# Patient Record
Sex: FEMALE | Race: WHITE | NOT HISPANIC OR LATINO | Employment: OTHER | URBAN - METROPOLITAN AREA
[De-identification: names, ages, dates, MRNs, and addresses within clinical notes are randomized per-mention and may not be internally consistent; named-entity substitution may affect disease eponyms.]

---

## 2017-08-17 PROBLEM — R06.83 SNORING: Status: ACTIVE | Noted: 2017-08-17

## 2018-01-19 ENCOUNTER — HOSPITAL ENCOUNTER (OUTPATIENT)
Dept: SLEEP MEDICINE | Facility: HOSPITAL | Age: 68
Discharge: HOME | End: 2018-01-19
Attending: NURSE PRACTITIONER
Payer: MEDICARE

## 2018-01-19 DIAGNOSIS — G47.33 OBSTRUCTIVE SLEEP APNEA (ADULT) (PEDIATRIC): ICD-10-CM

## 2018-01-19 PROCEDURE — 95810 POLYSOM 6/> YRS 4/> PARAM: CPT

## 2018-02-06 ENCOUNTER — HOSPITAL ENCOUNTER (OUTPATIENT)
Dept: SLEEP MEDICINE | Facility: HOSPITAL | Age: 68
Discharge: HOME | End: 2018-02-06
Attending: INTERNAL MEDICINE
Payer: MEDICARE

## 2018-02-06 DIAGNOSIS — G47.33 OBSTRUCTIVE SLEEP APNEA (ADULT) (PEDIATRIC): ICD-10-CM

## 2018-02-06 PROCEDURE — 95811 POLYSOM 6/>YRS CPAP 4/> PARM: CPT

## 2018-02-15 PROBLEM — K02.9 DENTAL CARIES: Status: ACTIVE | Noted: 2018-02-15

## 2019-02-13 PROBLEM — R91.1 LUNG NODULE: Status: ACTIVE | Noted: 2019-02-13

## 2020-02-14 PROBLEM — E11.69 HYPERLIPIDEMIA ASSOCIATED WITH TYPE 2 DIABETES MELLITUS: Status: ACTIVE | Noted: 2020-02-14

## 2020-02-14 PROBLEM — E78.5 HYPERLIPIDEMIA ASSOCIATED WITH TYPE 2 DIABETES MELLITUS: Status: ACTIVE | Noted: 2020-02-14

## 2020-06-27 ENCOUNTER — CLINICAL SUPPORT (OUTPATIENT)
Dept: URGENT CARE | Facility: CLINIC | Age: 70
End: 2020-06-27
Payer: MEDICARE

## 2020-06-27 VITALS — TEMPERATURE: 98 F

## 2020-06-27 DIAGNOSIS — Z11.59 ENCOUNTER FOR SCREENING FOR OTHER VIRAL DISEASES: ICD-10-CM

## 2020-06-27 PROCEDURE — U0003 INFECTIOUS AGENT DETECTION BY NUCLEIC ACID (DNA OR RNA); SEVERE ACUTE RESPIRATORY SYNDROME CORONAVIRUS 2 (SARS-COV-2) (CORONAVIRUS DISEASE [COVID-19]), AMPLIFIED PROBE TECHNIQUE, MAKING USE OF HIGH THROUGHPUT TECHNOLOGIES AS DESCRIBED BY CMS-2020-01-R: HCPCS

## 2020-06-27 NOTE — PROGRESS NOTES
Subjective:       Patient ID: Kay Stack is a 69 y.o. female.    Vitals:  tympanic temperature is 97.9 °F (36.6 °C).     Chief Complaint: COVID-19 Concerns    Patient was exposed to COVID 19 but is asymptomatic.      ROS    Objective:      Physical Exam      Assessment:       No diagnosis found.    Plan:         There are no diagnoses linked to this encounter.

## 2020-07-01 LAB — SARS-COV-2 RNA RESP QL NAA+PROBE: NOT DETECTED

## 2020-08-13 PROBLEM — R19.7 DIARRHEA DUE TO MALABSORPTION: Status: ACTIVE | Noted: 2020-08-13

## 2020-08-13 PROBLEM — K90.9 DIARRHEA DUE TO MALABSORPTION: Status: ACTIVE | Noted: 2020-08-13

## 2021-05-04 ENCOUNTER — PATIENT MESSAGE (OUTPATIENT)
Dept: RESEARCH | Facility: HOSPITAL | Age: 71
End: 2021-05-04

## 2024-02-01 ENCOUNTER — HOSPITAL ENCOUNTER (OUTPATIENT)
Dept: RADIOLOGY | Facility: HOSPITAL | Age: 74
Discharge: HOME | End: 2024-02-01
Payer: MEDICARE

## 2024-02-01 DIAGNOSIS — C50.911 INVASIVE LOBULAR CARCINOMA OF BREAST, STAGE 1, RIGHT: ICD-10-CM

## 2024-02-01 PROCEDURE — 77049 MRI BREAST C-+ W/CAD BI: CPT | Mod: 26,,, | Performed by: RADIOLOGY

## 2024-02-01 PROCEDURE — 77049 MRI BREAST C-+ W/CAD BI: CPT | Mod: TC

## 2024-02-01 PROCEDURE — A9577 INJ MULTIHANCE: HCPCS

## 2024-02-01 PROCEDURE — 25500020 PHARM REV CODE 255

## 2024-02-01 RX ADMIN — GADOBENATE DIMEGLUMINE 19 ML: 529 INJECTION, SOLUTION INTRAVENOUS at 04:02

## 2024-03-01 PROBLEM — C50.911: Status: ACTIVE | Noted: 2024-03-01

## 2024-03-13 PROBLEM — M10.072 IDIOPATHIC GOUT INVOLVING TOE OF LEFT FOOT: Status: ACTIVE | Noted: 2024-03-13

## 2024-03-13 PROBLEM — C50.919 MALIGNANT NEOPLASM OF FEMALE BREAST: Status: ACTIVE | Noted: 2024-03-13

## 2024-03-22 ENCOUNTER — CLINICAL SUPPORT (OUTPATIENT)
Dept: REHABILITATION | Facility: HOSPITAL | Age: 74
End: 2024-03-22

## 2024-03-22 DIAGNOSIS — R29.898 WEAKNESS OF BOTH UPPER EXTREMITIES: ICD-10-CM

## 2024-03-22 DIAGNOSIS — R29.3 ABNORMAL POSTURE: ICD-10-CM

## 2024-03-22 DIAGNOSIS — Z91.89 AT RISK FOR LYMPHEDEMA: ICD-10-CM

## 2024-03-22 DIAGNOSIS — M25.612 STIFFNESS OF LEFT SHOULDER JOINT: ICD-10-CM

## 2024-03-22 DIAGNOSIS — M25.611 STIFFNESS OF RIGHT SHOULDER JOINT: Primary | ICD-10-CM

## 2024-03-22 DIAGNOSIS — C50.911 INVASIVE LOBULAR CARCINOMA OF BREAST, STAGE 1, RIGHT: ICD-10-CM

## 2024-03-22 DIAGNOSIS — R68.89 DECREASED FUNCTIONAL ACTIVITY TOLERANCE: ICD-10-CM

## 2024-03-22 PROCEDURE — 97110 THERAPEUTIC EXERCISES: CPT

## 2024-03-22 PROCEDURE — 97162 PT EVAL MOD COMPLEX 30 MIN: CPT

## 2024-03-22 PROCEDURE — 97535 SELF CARE MNGMENT TRAINING: CPT

## 2024-03-22 NOTE — PATIENT INSTRUCTIONS
POST OP PATIENT EDUCATION    Post Operative Instructions         When to call your doctor:  - If any part of your affected arm or axilla is hot, red, or has increased swelling   - If you develop a temperature over 101 degrees Fahrenheit      Lymphedema - Identification and Prevention     Lymphedema - is the swelling of a body area or extremity caused by the accumulation of lymphatic fluid.  There is a risk for lymphedema with the removal of lymph nodes, trauma or radiation therapy.  Treatment of breast cancer often involves surgery: mastectomy or lumpectomy. Some of the lymph nodes in the underarm (called axillary lymph nodes) may be removed and checked to see if they contain cancer cells.     During breast surgery when axillary lymph nodes are removed (with sentinel node biopsy or axillary dissection) or are treated with radiation therapy, the lymphatic system may become impaired. This may prevent lymphatic fluid from leaving the area therefore, causing lymphedema.     Lymphatic fluid is a normal part of the circulatory system. Its function is to remove waste products and to produce cells vital to fighting infection. Swelling occurs when the vessels become restricted and the lymphatic fluid is unable to freely flow through them.  If lymphedema is left untreated, the affected limb could progressively become more swollen, which could lead to hardening of the skin, bulkiness in the limb, infection and impaired wound healing.     There are things you can do to decrease the chance of developing lymphedema.          [email or web address]            The information presented is intended for general information and educational purposes. It is not intended to replace the advice of your health care provider. Contact your health care provider if you believe you have a health problem.                          Trunk Rotation Stretch:     To Stretch the RIGHT Side:  Rotate knees to  the LEFT  Slowly raise your RIGHT arm up and out to the side, maintaining support on mat / bed  Hold for 10 seconds, then slowly lower RIGHT arm to starting position  Repeat 10 second holds x 10 repetitions     To Stretch the LEFT Side:   Rotate knees to the RIGHT  Slowly raise your LEFT arm up and out to the side, maintaining support on mat / bed  Hold for 10 seconds, then slowly lower LEFT arm to starting position  Repeat 10 second holds x 10 repetitions

## 2024-03-26 PROBLEM — R29.898 WEAKNESS OF BOTH UPPER EXTREMITIES: Status: ACTIVE | Noted: 2024-03-26

## 2024-03-26 PROBLEM — R29.3 ABNORMAL POSTURE: Status: ACTIVE | Noted: 2024-03-26

## 2024-03-26 PROBLEM — M25.611 STIFFNESS OF RIGHT SHOULDER JOINT: Status: ACTIVE | Noted: 2024-03-26

## 2024-03-26 PROBLEM — R68.89 DECREASED FUNCTIONAL ACTIVITY TOLERANCE: Status: ACTIVE | Noted: 2024-03-26

## 2024-03-26 PROBLEM — Z91.89 AT RISK FOR LYMPHEDEMA: Status: ACTIVE | Noted: 2024-03-26

## 2024-03-26 PROBLEM — M25.612 STIFFNESS OF LEFT SHOULDER JOINT: Status: ACTIVE | Noted: 2024-03-26

## 2024-03-26 NOTE — PLAN OF CARE
"[facility]   Physical Therapy Initial Evaluation      Name: Kay Ambrizblanc  Clinic Number: [id number]    Therapy Diagnosis:   Encounter Diagnoses   Name Primary?    Stiffness of right shoulder joint Yes    Stiffness of left shoulder joint     Weakness of both upper extremities     Abnormal posture     At risk for lymphedema     Decreased functional activity tolerance     Invasive lobular carcinoma of breast, stage 1, right         Physician: Jazlyn Shah MD    Physician Orders: PT Eval and Treat   Medical Diagnosis from Referral: C50.911 (ICD-10-CM) - Invasive lobular carcinoma of breast, stage 1, right  Evaluation Date: 3/22/2024  Authorization Period Expiration: 12/31/2024  Plan of Care Expiration: 05/17/2024  Progress Note Due: 04/22/2024  Visit # / Visits Authorized: 1 / 1   FOTO: 1/3    Precautions: standard, cancer, HTN, DM2, GERD, osteopenia    Time In: 1:47 PM  Time Out: 2:39 PM  Total Appointment Time (timed & untimed codes): 52 minutes    Subjective     Date of Onset: Abnormal screening mammogram on 12/07/2023 demonstrated an architectural distortion in RIGHT breast. Follow-up mammogram and ultrasound on 12/22/2023 revealed a 1 cm mass in the lower outer quadrant of the RIGHT breast. Ultrasound-guided biopsy on 12/29/2023 with pathology revealed stage pT1c N0 M0 grade 1 ER+ CO+ HER2 pending ILC of the RIGHT breast.    History of Current Condition: Kay is a 73 y.o. female that presents to [facility] at the [facility] s/p BILATERAL breast surgery.    Patient reports uneventful healing following surgery. Post-operative shoulder stiffness gradually improving, but patient to undergo adjuvant radiation (awaiting consult with XRT oncologist). Of note, patient reports "pulling" through RIGHT arm when reaching overhead; pulling extends to elbow but otherwise not bothersome at rest.    Cancer-Related Surgery and Date: BILATERAL " "mastectomy with RIGHT SLNB per Dr. Taylor on 03/01/2024.     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  Home Environment: trailer, ramped steps to enter; tub/shower  DME: none  Prior Level of Function: independent  Current Level of Function: difficulty with overhead reaching  Exercise Routine Prior to Onset: incidental activity when babysitting  Hand Dominance: right    Pain:  Current: 0/10,  Worst: 7/10,  Best: 0/10   Location: RIGHT elbow (medial)  Description: "pulling"  Aggravating Factors: overhead reaching  Easing Factors: rest    Patients Goals: to improve range of motion and maintain function while undergoing radiation     Medical History:   Past Medical History:   Diagnosis Date    DM (diabetes mellitus) 02/2020    Hyperlipidemia     Hypertension     Joint pain     Obesity     Preglaucoma     Senile nuclear cataract     Sleep apnea        Surgical History:   Kay Stack  has a past surgical history that includes Hysterectomy (2000); Cholecystectomy; Tonsillectomy; Oophorectomy (Bilateral, 2000); Bilateral mastectomy (Bilateral, 3/1/2024); and [place] lymph node biopsy (Right, 3/1/2024).    Medications:   Kay has a current medication list which includes the following prescription(s): acetaminophen, acetaminophen, benzonatate, blood sugar diagnostic, calcium-vitamin d3, citalopram, colestipol, conjugated estrogens, hydrochlorothiazide, lancets, metformin, metoprolol succinate, omeprazole, oxycodone, pravastatin, and vit b comp-c-folic acid-vit d3.    Allergies:   Review of patient's allergies indicates:   Allergen Reactions    Nexium [esomeprazole magnesium] Rash        Objective     Mental Status: A/O " "x 3    Postural Exam / Scapula Alignment: FHP, rounded shoulders    Skin Integrity: not formally observed during PT evaluation  Scar Location: N/A  Appearance: N/A  Signs of Infection: N/A  Drainage: N/A  Color: N/A    Edema: none  Location: N/A    Sensation: intact        Shoulder Range of Motion:   Active ROM LEFT RIGHT   Flexion 160 160   Abduction 150 150   Extension 60 60   IR/90deg 90 90   ER/90deg 55 45       Upper Extremity Strength:   (L) UE (R) UE   Shoulder Flexion 4/5 4/5   Shoulder Abduction 4/5 4/5   Shoulder IR 4/5 4/5   Shoulder ER 4/5 4/5   Elbow Flexion 4/5 4/5   Elbow Extension 4/5 4/5    34.1 lbs 37.9 lbs       BUE Circumferential Measurements:  LANDMARK LEFT UE RIGHT UE DIFFERENCE   E + 8" 38 cm 39.5 cm 1.5 cm   E + 6" 34 cm 35.5 cm 1.5 cm   E + 4" 33 cm 34.5 cm 1.5 cm   E + 2" 31.5 cm 32 cm 0.5 cm   Elbow 27 cm 28 cm 1 cm   W+ 8" 27 cm 27.5 cm 0.5 cm   W +  6" 25 cm 26.5 cm 1.5 cm   W + 4" 22 cm 22 cm 0 cm   Wrist 17.5 cm 17.5 cm 0 cm   DPC 19.5 cm 20 cm 0.5 cm   IP Thumb 7 cm 7 cm 0 cm       Functional Mobility (bed mobility, transfers): independent    ADL's: independent    Gait Assessment:  - Assistive Device Used: none  - Assistance: independent  - Distance: community distances       Limitation / Restriction for FOTO Shoulder Survey    Therapist reviewed FOTO scores for Kay Stack on 3/22/2024.   FOTO documents entered into [employer] - see Media section.    Limitation Score: 30%         Treatment     Total Treatment Time (time-based codes) Separate from Evaluation: 25 minutes       Kay received the treatments listed below:      Therapeutic exercises to develop flexibility and range of motion for 10 minutes:    HEP Review: Please see "Patient Instructions" for further details  - Butterfly stretch  - Wall slides (flexion, abduction)  - Doorway pec stretch      Self-care / home management education regarding lymphedema management for 15 minutes including:    - Lymphedema " "etiology and management plans.    - Written lymphedema risk reductions and precautions provided    Discussed possibility of axillary web syndrome ("cording") present in RIGHT upper extremity, causing "pulling" sensation to elbow. Recommended patient continue performing regular ADLs within tolerance but advised patient of possible painless, non-harmful "popping" sensation of cording with overhead reaching. Patient verbalized understanding, agreement.     Also discussed possibility of transferring therapy care to clinic closer to home while undergoing XRT at [facility]. Patient verbalized understanding, agreement for therapist to inquire about transfer to [facility] location.      Patient Education and Home Exercises     Education Provided Regarding:   - Role of physical therapy in multi-disciplinary team  - Goals for physical therapy  - Physical therapy plan of care, scheduling  - Home exercise program, exercise technique  - Energy conservation techniques    Written Home Exercises Provided: yes. Exercises were reviewed, and Kay was able to demonstrate them prior to the end of the session. Kay demonstrated good  understanding of the education provided. See EMR under Patient Instructions for exercises provided during therapy sessions.    Assessment     Kay is a 73 y.o. female referred to outpatient Physical Therapy with a medical diagnosis of RIGHT breast cancer. Patient presents with decreased BILATERAL shoulder active range of motion, decreased BILATERAL upper extremity strength, abnormal posture, increased risk of lymphedema, and limited tolerance to functional lifting / carrying / reaching / pushing / pulling activities of daily living.     Patient prognosis is Good.   Patient will benefit from skilled outpatient Physical Therapy to address the deficits stated above and in the chart below, provide patient / family education, and to maximize patient's level of independence. "     Plan of Care Discussed with Patient: Yes  Patient's spiritual, cultural, and educational needs considered, and patient is agreeable to the plan of care and goals as stated below:     Anticipated Barriers for Therapy:   - Possible local skin changes from upcoming XRT    Medical Necessity is demonstrated by the following:  History  Co-morbidities and personal factors that may impact the plan of care [] LOW: no personal factors / co-morbidities  [] MODERATE: 1-2 personal factors / co-morbidities  [x] HIGH: 3+ personal factors / co-morbidities    Moderate / High Support Documentation:     Co-morbidities affecting plan of care:  - History of cancer  - HTN  - Diabetes    Personal Factors:   - None     Examination  Body Structures and Functions, activity limitations and participation restrictions that may impact the plan of care [] LOW: addressing 1-2 elements  [] MODERATE: 3+ elements  [x] HIGH: 4+ elements (please support below)    Moderate / High Support Documentation:    Body Regions:  - Upper extremities  - Trunk     Body Systems:  - Gross symmetry  - Range of motion  - Strength  - Scar formation  - Skin integrity     Mobility:  - Lifting and carrying objects     Domestic Life:  - Doing housework     Life Areas:   - Recreation and leisure     Clinical Presentation [] LOW: stable  [x] MODERATE: Evolving  [] HIGH: Unstable     Decision Making / Complexity Score: moderate     Goals:   Short Term Goals: 4 Weeks  Patient will demonstrate 100% understanding of lymphedema risk reduction practices, including self-monitoring for lymphedema (progressing, not met).  Patient will demonstrate independence with established home exercise program for improved strength, functional mobility, range of motion, posture, and endurance (progressing, not met).   Patient will increase BILATERAL shoulder FLEXION active range of motion to 170 degrees for improved independence with functional reaching, carrying, pushing, and pulling tasks  (progressing, not met).   Patient will increase BILATERAL shoulder ABDUCTION active range of motion to 165 degrees for improved independence with functional reaching, carrying, pushing, and pulling tasks (progressing, not met).   Patient will increase gross BILATERAL upper extremity musculature to 4+/5 for improved independence with functional reaching, carrying, pushing, and pulling activities of daily living (progressing, not met).     Long Term Goals: 8 Weeks   Patient will be independent with updated home exercise program for improved functional mobility, posture, strength, and endurance (progressing, not met).  Patient will increase BILATERAL shoulder FLEXION active range of motion to 180 degrees for improved independence with functional reaching, carrying, pushing, and pulling tasks (progressing, not met).   Patient will increase BILATERAL shoulder ABDUCTION active range of motion to 180 degrees for improved independence with functional reaching, carrying, pushing, and pulling tasks (progressing, not met).   Patient will increase BILATERAL shoulder EXTERNAL ROTATION active range of motion to 90 degrees for improved independence with functional reaching, carrying, pushing, and pulling tasks (progressing, not met).   Patient will increase gross BILATERAL upper extremity musculature to 5/5 for improved independence with functional reaching, carrying, pushing, and pulling activities of daily living (progressing, not met).   Patient will report decrease in overall worst pain to 2/10 at discharge for improved tolerance to functional reaching, carrying, pushing, and pulling activities of daily living (progressing, not met).  Patient will report compliance with walking program 5x/week for 10 minutes / day to improve overall cardiovascular function and decrease cancer-related fatigue at discharge (progressing, not met).       Plan     Plan of Care Certification: 03/22/2024 to 05/17/2024.    Outpatient Physical Therapy  2 times weekly for 8 weeks to include the following interventions: Gait Training, Manual Therapy, Neuromuscular Re-ed, Patient Education, Self Care, Therapeutic Activities, Therapeutic Exercise, and IASTM.     Jazlyn Capellan, PT

## 2024-09-18 PROBLEM — Z51.81 ENCOUNTER FOR MONITORING AROMATASE INHIBITOR THERAPY: Status: ACTIVE | Noted: 2024-09-18

## 2024-09-18 PROBLEM — Z79.811 ENCOUNTER FOR MONITORING AROMATASE INHIBITOR THERAPY: Status: ACTIVE | Noted: 2024-09-18

## 2024-11-21 ENCOUNTER — PATIENT OUTREACH (OUTPATIENT)
Dept: ADMINISTRATIVE | Facility: HOSPITAL | Age: 74
End: 2024-11-21